# Patient Record
Sex: MALE | Race: WHITE
[De-identification: names, ages, dates, MRNs, and addresses within clinical notes are randomized per-mention and may not be internally consistent; named-entity substitution may affect disease eponyms.]

---

## 2017-01-09 ENCOUNTER — HOSPITAL ENCOUNTER (EMERGENCY)
Dept: HOSPITAL 74 - JERFT | Age: 22
LOS: 1 days | Discharge: HOME | End: 2017-01-10
Payer: COMMERCIAL

## 2017-01-09 VITALS — BODY MASS INDEX: 27.3 KG/M2

## 2017-01-09 VITALS — HEART RATE: 85 BPM | DIASTOLIC BLOOD PRESSURE: 76 MMHG | TEMPERATURE: 98.8 F | SYSTOLIC BLOOD PRESSURE: 132 MMHG

## 2017-01-09 DIAGNOSIS — J40: Primary | ICD-10-CM

## 2017-01-10 NOTE — PDOC
*Physical Exam





- Vital Signs


 Last Vital Signs











Temp Pulse Resp BP Pulse Ox


 


 98.8 F   85   18   132/76   99 


 


 01/09/17 23:05  01/09/17 23:05  01/09/17 23:05  01/09/17 23:05  01/09/17 23:05














<Reji Cotto - Last Filed: 01/10/17 00:31>





- Vital Signs


 Last Vital Signs











Temp Pulse Resp BP Pulse Ox


 


 98.8 F   85   18   132/76   99 


 


 01/09/17 23:05  01/09/17 23:05  01/09/17 23:05  01/09/17 23:05  01/09/17 23:05














<Natalee Patel - Last Filed: 01/10/17 00:47>





ED Treatment Course





- RADIOLOGY


Radiology Studies Ordered: 














 Category Date Time Status


 


 CHEST PA & LAT [RAD] Stat Radiology  01/10/17 00:03 Taken











Radiograph Interpretation: 





01/10/17 00:46


CXR: 2v NAD





<Natalee Patel - Last Filed: 01/10/17 00:47>





Medical Decision Making





- Medical Decision Making





01/10/17 00:31


agree with care from MAITE Patel





<Reji Cotto - Last Filed: 01/10/17 00:31>





*DC/Admit/Observation/Transfer





<Reji Cotto - Last Filed: 01/10/17 00:31>





<Natalee Patel - Last Filed: 01/10/17 00:47>


Diagnosis at time of Disposition: 


 Bronchitis





- Discharge Dispostion


Disposition: HOME


Condition at time of disposition: Stable





- Prescriptions


Prescriptions: 


Azithromycin [Zithromax -] 250 mg PO DAILY #4 tablet





- Patient Instructions


Printed Discharge Instructions:  DI for Acute Bronchitis


Additional Instructions: 


Rest


Increase fluids


Tylenol/Motrin as needed for pain/fever





Return to the ER for severe/persistent/worsening symptoms

## 2017-05-23 ENCOUNTER — HOSPITAL ENCOUNTER (EMERGENCY)
Dept: HOSPITAL 74 - JER | Age: 22
Discharge: HOME | End: 2017-05-23
Payer: COMMERCIAL

## 2017-05-23 VITALS — DIASTOLIC BLOOD PRESSURE: 87 MMHG | SYSTOLIC BLOOD PRESSURE: 139 MMHG | HEART RATE: 80 BPM | TEMPERATURE: 98.2 F

## 2017-05-23 VITALS — BODY MASS INDEX: 27.3 KG/M2

## 2017-05-23 DIAGNOSIS — R06.2: Primary | ICD-10-CM

## 2017-05-23 PROCEDURE — 3E0F7GC INTRODUCTION OF OTHER THERAPEUTIC SUBSTANCE INTO RESPIRATORY TRACT, VIA NATURAL OR ARTIFICIAL OPENING: ICD-10-PCS

## 2017-05-23 NOTE — PDOC
History of Present Illness





- General


Chief Complaint: Respiratory


Stated Complaint: TROUBLE BREATHING


Time Seen by Provider: 05/23/17 07:48


History Source: Patient


Exam Limitations: No Limitations





- History of Present Illness


Initial Comments: 





05/23/17 08:16


22-year-old male presents to the ED with complaints of nasal congestion, cough 

and wheezing since yesterday. Patient states has had similar symptoms in the 

spring and last spring and denies any recent hospitalizations or history of 

asthma. Patient denies smoking history, recent travel, fever, chills, shortness 

of breath, or chest pain. 


Timing/Duration: reports: yesterday


Severity: reports: mild


Possible Cause: Yes: occasional episodes


Modifying Factors: improves with: coughing


Associated Symptoms: reports: cough, nasal congestion, wheezing





Past History





- Past Medical History


Allergies/Adverse Reactions: 


 Allergies











Allergy/AdvReac Type Severity Reaction Status Date / Time


 


No Known Allergies Allergy   Verified 05/23/17 07:26











Home Medications: 


Ambulatory Orders





Azithromycin [Zithromax -] 250 mg PO DAILY #4 tablet 01/10/17 








Other medical history: Patient denies medical history





- Immunization History


Immunization Up to Date: Yes





- Psycho/Social/Smoking Cessation Hx


Anxiety: No


Suicidal Ideation: No


Smoking Status: No


Smoking History: Never smoked


Number of Cigarettes Smoked Daily: 0


Hx Alcohol Use: No


Drug/Substance Use Hx: No


Substance Use Type: None


Patient Lives Alone: No


Lives with/in: parents





Respiratory Specific PMHX





- Complaint Specific PMHX


Bronchitis: No





**Review of Systems





- Review of Systems


Able to Perform ROS?: Yes


Constitutional: No: Symptoms Reported


HEENTM: Yes: Nose Congestion


Respiratory: Yes: Cough, Wheezing


Cardiac (ROS): No: Symptoms Reported


ABD/GI: No: Symptoms Reported


Musculoskeletal: No: Symptoms Reported


Neurological: No: Headache, Dizziness





*Physical Exam





- Vital Signs


 Last Vital Signs











Temp Pulse Resp BP Pulse Ox


 


 98.2 F   80   18   139/87   97 


 


 05/23/17 07:23  05/23/17 07:23  05/23/17 07:23  05/23/17 07:23  05/23/17 07:23














- Physical Exam


General Appearance: Yes: Nourished, Appropriately Dressed.  No: Apparent 

Distress


HEENT: positive: EOMI, DEREJE, TMs Normal, Pharynx Normal, Nasal Congestion.  

negative: Pale Conjunctivae, Sinus Tenderness


Neck: positive: Supple


Respiratory/Chest: positive: Wheezing (expiratory to left base)


Cardiovascular: positive: Regular Rhythm, Regular Rate.  negative: Murmur


Gastrointestinal/Abdominal: positive: Soft.  negative: Tenderness


Musculoskeletal: negative: CVA Tenderness


Extremity: positive: Normal Capillary Refill.  negative: Pedal Edema


Integumentary: positive: Normal Color, Warm, Moist


Neurologic: positive: Motor Strength 5/5 (ambulatory)





Medical Decision Making





- Medical Decision Making





05/23/17 08:21


Patient with complaints of nasal congestion, cough and wheezing since 

yesterday. Patient denies smoking history fever, patient on exam had expiratory 

wheeze the left base will be discharged home with prednisone and albuterol 

inhaler. Along with  Claritin for the next 5 days.





*DC/Admit/Observation/Transfer


Diagnosis at time of Disposition: 


 Wheezing, Cough, Nasal congestion





- Discharge Dispostion


Disposition: HOME


Condition at time of disposition: Improved





- Referrals


Referrals: 


Kiran Cevallos MD [Primary Care Provider] - 





- Patient Instructions


Printed Discharge Instructions:  DI for Cough -- Adult


Additional Instructions: 


Please take prednisone for the next 3 days as prescribed starting tomorrow 

since your given your first dose here in the ER. Please use inhaler as needed 

for cough and wheezing.


Please take Claritin for the next 5 days along with other medication.


Follow-up with your PCP. 


Otherwise return to ED if symptoms worsen.

## 2017-11-14 ENCOUNTER — HOSPITAL ENCOUNTER (EMERGENCY)
Dept: HOSPITAL 74 - JERFT | Age: 22
Discharge: HOME | End: 2017-11-14
Payer: COMMERCIAL

## 2017-11-14 VITALS — BODY MASS INDEX: 25.8 KG/M2

## 2017-11-14 VITALS — HEART RATE: 68 BPM | SYSTOLIC BLOOD PRESSURE: 137 MMHG | DIASTOLIC BLOOD PRESSURE: 76 MMHG | TEMPERATURE: 98.2 F
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
## 2017-11-14 DIAGNOSIS — S93.401A: Primary | ICD-10-CM

## 2017-11-14 NOTE — PDOC
History of Present Illness





- General


Chief Complaint: Injury


Stated Complaint: FALL/ RT FOOT INJURY


Time Seen by Provider: 11/14/17 10:55


History Source: Patient


Exam Limitations: Language Barrier





- History of Present Illness


Initial Comments: 





11/14/17 10:57


My chief complaint: Right ankle pain 








History of present illness: Patient is a 22-year-old male here today 

complaining of rt.ankle pain slipping on a rug in the back of his truck at work 

this morning  causing him to fall twisting his right foot and ankle. Reports 

having sharp pain right ankle laterally and medially with difficulty walking 

due to pain. Patient reports the pain currently as a 7 out of 10. Patient has 

not taken anything for pain as yet. Patient having difficulty walking due to 

pain. Patient denies any other injuries at this time. Patient denies any 

numbness of his right foot or lower extremity.














11/14/17 10:58





11/14/17 12:03





Occurred: reports: this morning


Severity: reports: moderate


Pain Location: reports: lower extremity (rt. ankle/foot )


Method of Injury: Yes: fall (twisted rt. ankle/foot )


Modifying Factors: improves with: None


Loss of Consciousness: no loss of consciousness


Associated Symptoms (Fall): trouble walking (right ankle/foot pain n)





Past History





- Past Medical History


Allergies/Adverse Reactions: 


 Allergies











Allergy/AdvReac Type Severity Reaction Status Date / Time


 


No Known Allergies Allergy   Verified 11/14/17 09:39











Home Medications: 


Ambulatory Orders





NK [No Known Home Medication]  11/14/17 








COPD: No





- Immunization History


Immunization Up to Date: Yes





- Suicide/Smoking/Psychosocial Hx


Smoking Status: No


Smoking History: Never smoked


Have you smoked in the past 12 months: No


Number of Cigarettes Smoked Daily: 0


Information on smoking cessation initiated: No


Hx Alcohol Use: No


Drug/Substance Use Hx: No


Substance Use Type: None





**Review of Systems





- Review of Systems


Able to Perform ROS?: Yes


Constitutional: No: Symptoms Reported


HEENTM: No: Symptoms Reported


Respiratory: No: Symptoms reported


Cardiac (ROS): No: Symptoms Reported


ABD/GI: No: Symptoms Reported


: No: Symptoms Reported


Musculoskeletal: Yes: Joint Pain (rt.foot/ankle ), Joint Swelling (rt. ankle )


Integumentary: No: Symptoms Reported


Neurological: No: Symptoms reported





*Physical Exam





- Vital Signs


 Last Vital Signs











Temp Pulse Resp BP Pulse Ox


 


 98.2 F   68   15   137/76   98 


 


 11/14/17 09:40  11/14/17 09:40  11/14/17 09:40  11/14/17 09:40  11/14/17 09:40














- Physical Exam


General Appearance: Yes: Appropriately Dressed


Vascular Pulses: Dorsalis-Pedis (R): 4+


Extremity: positive: Normal Capillary Refill, Normal Range of Motion (rt.foot 

toes, flexion and extension of ankle ), Tender (rt. medial/lateral malleolus), 

Swelling (rt. medial/lateral malleolus).  negative: Normal Inspection


Integumentary: positive: Swelling (rt. medial/lateral malleolus)


Neurologic: positive: Normal Response, Respond to painful stimul (rt. foot/

ankle ), Responsive


Deep Tendon Reflexes: Ankle (R): 4+ (no induration )





ED Treatment Course





- RADIOLOGY


Radiology Studies Ordered: 














 Category Date Time Status


 


 ANKLE & FOOT-RIGHT* [RAD] Stat Radiology  11/14/17 10:56 Ordered














Medical Decision Making





- Medical Decision Making





11/14/17 10:57














11/14/17 12:05


Patient is a 22-year-old male here today complaining of rt.ankle pain slipping 

on a rug in the back of his truck at work this morning  causing him to fall 

twisting his right foot and ankle. Reports having sharp pain right ankle 

laterally and medially with difficulty walking due to pain. Patient reports the 

pain currently as a 7 out of 10. Patient has not taken anything for pain as 

yet. Patient having difficulty walking due to pain. Patient denies any other 

injuries at this time. Patient denies any numbness of his right foot or lower 

extremity.








R/O fracture rt./ ankle/foot 


sprain rt. ankle 











PLAN: 








xray rt. foot/ankle, no fracture noted  PER DR. GUZMAN


ace wrap 3 inch, aircast applied to rt. foot/ankle


crutches given 





ibuprofen 600 mg po now 


follow up with ortho 


11/14/17 19:18








*DC/Admit/Observation/Transfer


Diagnosis at time of Disposition: 


Sprain of ankle, right


Qualifiers:


 Encounter type: initial encounter Involved ligament of ankle: unspecified 

ligament Qualified Code(s): S93.401A - Sprain of unspecified ligament of right 

ankle, initial encounter





Fall


Qualifiers:


 Encounter type: initial encounter Qualified Code(s): W19.XXXA - Unspecified 

fall, initial encounter








- Discharge Dispostion


Disposition: HOME


Condition at time of disposition: Stable





- Referrals


Referrals: 


Kiran Cevallos MD [Primary Care Provider] - 


Burce Alcala MD [Staff Physician] - 





- Patient Instructions


Additional Instructions: 











Right foot as much as possible








Today and tomorrow and apply ice every hour for at least 15 minutes each time 

this swollen area keep Ace wrap on an air cast during the day may take off at 

night and use crutches for ambulation 











follow-up with orthopedist AS SOON AS POSSIBLE 











Rocephin as needed as directed by  for pain











Return to emergency room if any numbness of your foot or ankle or any new 

symptoms develop











PATIENT VOICED UNDERSTANDING OF DISCHARGE INSTRUCTIONS AND ALL QUESTIONS WERE 

ANSWERED











THANK YOU FOR CHOOSING [facility] EMERGENCY ROOM FOR YOUR MEDICAL 

NEEDS TODAY 





 

















- Post Discharge Activity


Forms/Work/School Notes:  Back to Work

## 2019-07-30 ENCOUNTER — HOSPITAL ENCOUNTER (EMERGENCY)
Dept: HOSPITAL 74 - JER | Age: 24
Discharge: HOME | End: 2019-07-30
Payer: COMMERCIAL

## 2019-07-30 VITALS — DIASTOLIC BLOOD PRESSURE: 80 MMHG | HEART RATE: 86 BPM | TEMPERATURE: 98.8 F | SYSTOLIC BLOOD PRESSURE: 129 MMHG

## 2019-07-30 VITALS — BODY MASS INDEX: 28.3 KG/M2

## 2019-07-30 DIAGNOSIS — K64.4: Primary | ICD-10-CM

## 2019-07-30 NOTE — PDOC
History of Present Illness





- General


Chief Complaint: Hemorrhoids


Stated Complaint: ANAL PAIN


Time Seen by Provider: 07/30/19 01:19


History Source: Patient





- History of Present Illness


Initial Comments: 





07/30/19 01:29


24 year old male c/o external hemorrhoids x 1 week with anal pain. patient used 

preparation H prior to coming now reports pain relief. patient reported not 

some blood with wiping. denies anal sex, constipation. reports heavy lifting at 

work











No pmhx








07/30/19 01:31








Past History





- Past Medical History


Allergies/Adverse Reactions: 


 Allergies











Allergy/AdvReac Type Severity Reaction Status Date / Time


 


No Known Allergies Allergy   Verified 11/14/17 09:39











Home Medications: 


Ambulatory Orders





Docusate Sodium [Colace] 100 mg PO BID #30 capsule 07/30/19 


Hydrocortisone Acetate [Anusol Hc Suppository -] 25 mg RC BID #28 supp.rect 07/ 30/19 


Polyethylene Glycol 3350 [Miralax (For Daily Use) -] 17 gm PO DAILY #1 bottle 07 /30/19 








COPD: No





- Immunization History


Immunization Up to Date: Yes





- Suicide/Smoking/Psychosocial Hx


Smoking Status: No


Smoking History: Never smoked


Have you smoked in the past 12 months: No


Number of Cigarettes Smoked Daily: 0


Hx Alcohol Use: No


Drug/Substance Use Hx: No


Substance Use Type: None





**Review of Systems





- Review of Systems


Able to Perform ROS?: Yes


Is the patient limited English proficient: No


Constitutional: No: Symptoms Reported, See HPI, Chills, Diaphoresis, Fever, 

Loss of Appetite, Malaise, Night Sweats, Weakness, Weight Stable, Unintentional 

Wgt. Loss, Unexplained wgt Loss, Other


ABD/GI: Yes: Rectal Bleeding


: No: Symptoms Reported, See HPI, Burning, Dysuria, Discharge, Frequency, 

Flank Pain, Hematuria, Incontinence, Pain, Urgency, Testicular Mass, Testicular 

Swelling, Lesions, Testicular Pain, Other


Musculoskeletal: No: Symptoms Reported, See HPI, Back Pain, Gout, Joint Pain, 

Joint Swelling, Muscle Pain, Muscle Weakness, Neck Pain, Joint Stiffness, Other





*Physical Exam





- Vital Signs





07/30/19 01:38


A: external hemorrhoids








P: anusol








stool softners





miralax





outpatient pcp follow up. discussed possible rectal surgery follow up needed





- Physical Exam


General Appearance: Yes: Appropriately Dressed


Rectal Exam: positive: hemorrhoids


Neurologic: positive: Fully Oriented, Alert, Normal Mood/Affect





*DC/Admit/Observation/Transfer


Diagnosis at time of Disposition: 


 External hemorrhoid








- Discharge Dispostion


Disposition: HOME


Condition at time of disposition: Fair





- Prescriptions


Prescriptions: 


Docusate Sodium [Colace] 100 mg PO BID #30 capsule


Hydrocortisone Acetate [Anusol Hc Suppository -] 25 mg RC BID #28 supp.rect


Polyethylene Glycol 3350 [Miralax (For Daily Use) -] 17 gm PO DAILY #1 bottle





- Referrals


Referrals: 


Kiran Cevallos MD [Primary Care Provider] - 





- Patient Instructions


Printed Discharge Instructions:  DI for Hemorrhoids


Additional Instructions: 


take Colace and MiraLAX as prescribed.








use Anusol as prescribed








follow-up with your primary doctor as soon as possible.








eturn to the emergency room for any worsening symptoms.





- Post Discharge Activity


Forms/Work/School Notes:  Back to Work

## 2021-08-15 ENCOUNTER — HOSPITAL ENCOUNTER (EMERGENCY)
Dept: HOSPITAL 74 - JER | Age: 26
Discharge: HOME | End: 2021-08-15
Payer: COMMERCIAL

## 2021-08-15 VITALS — BODY MASS INDEX: 29 KG/M2

## 2021-08-15 VITALS — DIASTOLIC BLOOD PRESSURE: 79 MMHG | HEART RATE: 77 BPM | SYSTOLIC BLOOD PRESSURE: 137 MMHG | TEMPERATURE: 98.7 F

## 2021-08-15 DIAGNOSIS — Z11.52: ICD-10-CM

## 2021-08-15 DIAGNOSIS — J02.9: Primary | ICD-10-CM

## 2021-08-15 PROCEDURE — U0005 INFEC AGEN DETEC AMPLI PROBE: HCPCS

## 2021-08-15 PROCEDURE — C9803 HOPD COVID-19 SPEC COLLECT: HCPCS

## 2021-08-15 PROCEDURE — U0003 INFECTIOUS AGENT DETECTION BY NUCLEIC ACID (DNA OR RNA); SEVERE ACUTE RESPIRATORY SYNDROME CORONAVIRUS 2 (SARS-COV-2) (CORONAVIRUS DISEASE [COVID-19]), AMPLIFIED PROBE TECHNIQUE, MAKING USE OF HIGH THROUGHPUT TECHNOLOGIES AS DESCRIBED BY CMS-2020-01-R: HCPCS

## 2022-04-22 ENCOUNTER — HOSPITAL ENCOUNTER (EMERGENCY)
Dept: HOSPITAL 74 - JER | Age: 27
LOS: 1 days | Discharge: HOME | End: 2022-04-23
Payer: COMMERCIAL

## 2022-04-22 VITALS — HEART RATE: 102 BPM | TEMPERATURE: 98 F | SYSTOLIC BLOOD PRESSURE: 115 MMHG | DIASTOLIC BLOOD PRESSURE: 66 MMHG

## 2022-04-22 VITALS — BODY MASS INDEX: 29 KG/M2

## 2022-04-22 DIAGNOSIS — R11.10: Primary | ICD-10-CM

## 2023-01-26 ENCOUNTER — HOSPITAL ENCOUNTER (EMERGENCY)
Dept: HOSPITAL 74 - JER | Age: 28
Discharge: HOME | End: 2023-01-26
Payer: COMMERCIAL

## 2023-01-26 VITALS
RESPIRATION RATE: 18 BRPM | DIASTOLIC BLOOD PRESSURE: 78 MMHG | HEART RATE: 74 BPM | SYSTOLIC BLOOD PRESSURE: 122 MMHG | TEMPERATURE: 97.8 F

## 2023-01-26 VITALS — BODY MASS INDEX: 29.8 KG/M2

## 2023-01-26 DIAGNOSIS — B34.9: Primary | ICD-10-CM
